# Patient Record
Sex: FEMALE | Race: BLACK OR AFRICAN AMERICAN | Employment: STUDENT | URBAN - METROPOLITAN AREA
[De-identification: names, ages, dates, MRNs, and addresses within clinical notes are randomized per-mention and may not be internally consistent; named-entity substitution may affect disease eponyms.]

---

## 2019-12-17 ENCOUNTER — HOSPITAL ENCOUNTER (OUTPATIENT)
Dept: MRI IMAGING | Age: 19
Discharge: HOME | End: 2019-12-17
Attending: ORTHOPAEDIC SURGERY
Payer: COMMERCIAL

## 2019-12-17 DIAGNOSIS — M25.561 RIGHT KNEE PAIN: ICD-10-CM

## 2019-12-17 PROCEDURE — 73721 MRI JNT OF LWR EXTRE W/O DYE: CPT

## 2024-05-21 ENCOUNTER — OFFICE VISIT (OUTPATIENT)
Age: 24
End: 2024-05-21
Payer: COMMERCIAL

## 2024-05-21 VITALS
OXYGEN SATURATION: 98 % | HEIGHT: 72 IN | BODY MASS INDEX: 20.29 KG/M2 | TEMPERATURE: 98.4 F | WEIGHT: 149.8 LBS | SYSTOLIC BLOOD PRESSURE: 96 MMHG | DIASTOLIC BLOOD PRESSURE: 67 MMHG | HEART RATE: 118 BPM | RESPIRATION RATE: 18 BRPM

## 2024-05-21 DIAGNOSIS — L05.01 PILONIDAL ABSCESS: Primary | ICD-10-CM

## 2024-05-21 PROCEDURE — 99203 OFFICE O/P NEW LOW 30 MIN: CPT | Performed by: SURGERY

## 2024-05-21 RX ORDER — CEPHALEXIN 500 MG/1
500 CAPSULE ORAL 4 TIMES DAILY
COMMUNITY

## 2024-05-21 ASSESSMENT — ANXIETY QUESTIONNAIRES
IF YOU CHECKED OFF ANY PROBLEMS ON THIS QUESTIONNAIRE, HOW DIFFICULT HAVE THESE PROBLEMS MADE IT FOR YOU TO DO YOUR WORK, TAKE CARE OF THINGS AT HOME, OR GET ALONG WITH OTHER PEOPLE: NOT DIFFICULT AT ALL
1. FEELING NERVOUS, ANXIOUS, OR ON EDGE: NOT AT ALL
5. BEING SO RESTLESS THAT IT IS HARD TO SIT STILL: NOT AT ALL
GAD7 TOTAL SCORE: 0
2. NOT BEING ABLE TO STOP OR CONTROL WORRYING: NOT AT ALL
4. TROUBLE RELAXING: NOT AT ALL
3. WORRYING TOO MUCH ABOUT DIFFERENT THINGS: NOT AT ALL
7. FEELING AFRAID AS IF SOMETHING AWFUL MIGHT HAPPEN: NOT AT ALL
6. BECOMING EASILY ANNOYED OR IRRITABLE: NOT AT ALL

## 2024-05-21 ASSESSMENT — PATIENT HEALTH QUESTIONNAIRE - PHQ9
SUM OF ALL RESPONSES TO PHQ QUESTIONS 1-9: 0
SUM OF ALL RESPONSES TO PHQ QUESTIONS 1-9: 0
SUM OF ALL RESPONSES TO PHQ9 QUESTIONS 1 & 2: 0
SUM OF ALL RESPONSES TO PHQ QUESTIONS 1-9: 0
SUM OF ALL RESPONSES TO PHQ QUESTIONS 1-9: 0
1. LITTLE INTEREST OR PLEASURE IN DOING THINGS: NOT AT ALL
2. FEELING DOWN, DEPRESSED OR HOPELESS: NOT AT ALL

## 2024-05-22 ENCOUNTER — OFFICE VISIT (OUTPATIENT)
Age: 24
End: 2024-05-22
Payer: COMMERCIAL

## 2024-05-22 VITALS
HEART RATE: 98 BPM | WEIGHT: 149 LBS | OXYGEN SATURATION: 98 % | TEMPERATURE: 98.1 F | SYSTOLIC BLOOD PRESSURE: 107 MMHG | DIASTOLIC BLOOD PRESSURE: 68 MMHG | RESPIRATION RATE: 14 BRPM | BODY MASS INDEX: 20.18 KG/M2 | HEIGHT: 72 IN

## 2024-05-22 DIAGNOSIS — L05.01 PILONIDAL ABSCESS: Primary | ICD-10-CM

## 2024-05-22 PROCEDURE — 10080 I&D PILONIDAL CYST SIMPLE: CPT | Performed by: SURGERY

## 2024-05-22 ASSESSMENT — PATIENT HEALTH QUESTIONNAIRE - PHQ9
SUM OF ALL RESPONSES TO PHQ9 QUESTIONS 1 & 2: 0
SUM OF ALL RESPONSES TO PHQ QUESTIONS 1-9: 0
2. FEELING DOWN, DEPRESSED OR HOPELESS: NOT AT ALL
1. LITTLE INTEREST OR PLEASURE IN DOING THINGS: NOT AT ALL

## 2024-05-22 NOTE — PROGRESS NOTES
Saw Dr. Castillo yesterday with a pilonidal abscess.  She really wanted to try antibiotic rather than drainage. Overnight the pain was worse so she returns today for drainage.      Incision and Drainage Procedure Note  Indication: pilonidal abscess    Procedure: The patient was positioned appropriately and the skin over the incision site was prepped with betadine and draped in a sterile fashion. Local anesthesia was obtained by infiltration using 1% Lidocaine with epinephrine.  An incision was then made over the greatest area of fluctuance and approximately 30 cc of thick, foul smelling, and purulent material was expressed. Loculations were not present. The drainage cavity was then dressed with a sterile dressing.     The patient tolerated the procedure well.    Complications: None    Electronically Signed by: Flash Milligan MD

## 2024-05-22 NOTE — PROGRESS NOTES
Identified patient with two patient identifiers (name and ). Reviewed chart in preparation for visit and have obtained necessary documentation.    Svitlana Jimenez is a 23 y.o. female  Chief Complaint   Patient presents with    New Patient     Abscess on tailbone     BP 96/67 (Site: Right Upper Arm, Position: Sitting, Cuff Size: Small Adult)   Pulse (!) 118   Temp 98.4 °F (36.9 °C) (Oral)   Resp 18   Ht 1.93 m (6' 4\")   Wt 67.9 kg (149 lb 12.8 oz)   SpO2 98%   BMI 18.23 kg/m²     1. Have you been to the ER, urgent care clinic since your last visit?  Hospitalized since your last visit?yes     2. Have you seen or consulted any other health care providers outside of the [facility] since your last visit?  Include any pap smears or colon screening. no  
murmur  Abdomen - soft, nontender, nondistended, no masses or organomegaly  Neurological - alert, oriented, normal speech, no focal findings or movement disorder noted  Extremities - no pedal edema, no clubbing or cyanosis  Skin -  2 cm x 1.5 cm tender, slightly fluctuant area below and to right of coccyx; no cellulitis of underlying skin    Assessment:     Pilonidal abscess; differential includes sahara-anal and buttock abscesses.     Plan:      1. I recommend proceeding with continued oral antibiotics, moist heat. Treatment alternatives were discussed.  2. Discussed if symptoms not improving by Thursday AM to contact our office for Thursday PM re-evaluation, likely incision/drainage. Patient in agreement.

## 2024-05-22 NOTE — PROGRESS NOTES
Identified patient with two patient identifiers (name and ). Reviewed chart in preparation for visit and have obtained necessary documentation.    Svitlana Jimenez is a 23 y.o. female  Chief Complaint   Patient presents with    Procedure     Incision and drainage of Pilonidal abscess      /68 (Site: Right Upper Arm, Position: Sitting, Cuff Size: Small Adult)   Pulse 98   Temp 98.1 °F (36.7 °C) (Oral)   Resp 14   Ht 1.93 m (6' 4\")   Wt 67.6 kg (149 lb)   SpO2 98%   BMI 18.14 kg/m²     1. Have you been to the ER, urgent care clinic since your last visit?  Hospitalized since your last visit? [facility]     2. Have you seen or consulted any other health care providers outside of the [facility] since your last visit?  Include any pap smears or colon screening. no

## 2025-01-27 ENCOUNTER — OFFICE VISIT (OUTPATIENT)
Age: 25
End: 2025-01-27
Payer: COMMERCIAL

## 2025-01-27 VITALS
HEIGHT: 72 IN | HEART RATE: 64 BPM | RESPIRATION RATE: 17 BRPM | TEMPERATURE: 98.7 F | WEIGHT: 147.2 LBS | DIASTOLIC BLOOD PRESSURE: 67 MMHG | SYSTOLIC BLOOD PRESSURE: 101 MMHG | BODY MASS INDEX: 19.94 KG/M2

## 2025-01-27 DIAGNOSIS — K62.89 PERIANAL INFECTION: Primary | ICD-10-CM

## 2025-01-27 PROCEDURE — 99213 OFFICE O/P EST LOW 20 MIN: CPT | Performed by: SURGERY

## 2025-01-27 RX ORDER — METRONIDAZOLE 500 MG/1
500 TABLET ORAL 3 TIMES DAILY
Qty: 30 TABLET | Refills: 0 | Status: SHIPPED | OUTPATIENT
Start: 2025-01-27 | End: 2025-02-06

## 2025-01-27 ASSESSMENT — PATIENT HEALTH QUESTIONNAIRE - PHQ9
SUM OF ALL RESPONSES TO PHQ QUESTIONS 1-9: 0
2. FEELING DOWN, DEPRESSED OR HOPELESS: NOT AT ALL
SUM OF ALL RESPONSES TO PHQ QUESTIONS 1-9: 0
SUM OF ALL RESPONSES TO PHQ9 QUESTIONS 1 & 2: 0
1. LITTLE INTEREST OR PLEASURE IN DOING THINGS: NOT AT ALL

## 2025-01-27 ASSESSMENT — ANXIETY QUESTIONNAIRES
GAD7 TOTAL SCORE: 0
5. BEING SO RESTLESS THAT IT IS HARD TO SIT STILL: NOT AT ALL
3. WORRYING TOO MUCH ABOUT DIFFERENT THINGS: NOT AT ALL
7. FEELING AFRAID AS IF SOMETHING AWFUL MIGHT HAPPEN: NOT AT ALL
6. BECOMING EASILY ANNOYED OR IRRITABLE: NOT AT ALL
2. NOT BEING ABLE TO STOP OR CONTROL WORRYING: NOT AT ALL
1. FEELING NERVOUS, ANXIOUS, OR ON EDGE: NOT AT ALL
4. TROUBLE RELAXING: NOT AT ALL
IF YOU CHECKED OFF ANY PROBLEMS ON THIS QUESTIONNAIRE, HOW DIFFICULT HAVE THESE PROBLEMS MADE IT FOR YOU TO DO YOUR WORK, TAKE CARE OF THINGS AT HOME, OR GET ALONG WITH OTHER PEOPLE: NOT DIFFICULT AT ALL

## 2025-01-27 NOTE — PROGRESS NOTES
Surgery History and Physical    Subjective:      Svitlana Jimenez  is a 24 y.o.   female who presents with a tender right perianal almost perineal swelling that is tender and warm. No fever nor chills. Not related to her charles pilonidal abscess.    Past Medical History:   Diagnosis Date    Perianal infection 01/27/2025       History reviewed. No pertinent surgical history.    Social History     Tobacco Use    Smoking status: Never     Passive exposure: Never    Smokeless tobacco: Never   Substance Use Topics    Alcohol use: Never       History reviewed. No pertinent family history.    No current outpatient medications on file prior to visit.     No current facility-administered medications on file prior to visit.       Allergies   Allergen Reactions    Nuts [Peanut-Containing Drug Products] Nausea And Vomiting         Review of Systems:    Pertinent items are noted in the History of Present Illness.    Objective:     Vitals:    01/27/25 1013   BP: 101/67   Pulse: 64   Resp: 17   Temp: 98.7 °F (37.1 °C)        Physical Exam:  GENERAL: alert, appears stated age, and cooperative, SKIN: in the right anterolateral perianal skin is a 22 cm firm tender area of infection.  No drainage.    Labs: No results found for this or any previous visit (from the past 24 hour(s)).    Data Review:   none    Assessment and Plan:      Diagnosis Orders   1. Perianal infection            This is either an early perianal abscess reflective of a fistula in an or a focal folliculitis.  Given its location will try Flagyl. She will keep me posted.      Signed By: Flash Milligan MD     01/27/25

## 2025-01-27 NOTE — PROGRESS NOTES
Identified pt with two pt identifiers (name and ). Reviewed chart in preparation for visit and have obtained necessary documentation.    Svitlana Jimenez is a 24 y.o. female New Patient (Cyst of lower buttocks)  .    Vitals:    25 1013   BP: 101/67   Site: Left Upper Arm   Position: Sitting   Cuff Size: Large Adult   Pulse: 64   Resp: 17   Temp: 98.7 °F (37.1 °C)   TempSrc: Oral   Weight: 66.8 kg (147 lb 3.2 oz)   Height: 1.93 m (6' 4\")          1. Have you been to the ER, urgent care clinic since your last visit?  Hospitalized since your last visit?  no     2. Have you seen or consulted any other health care providers outside of the [facility] since your last visit?  Include any pap smears or colon screening.  no